# Patient Record
Sex: FEMALE | Race: WHITE | URBAN - METROPOLITAN AREA
[De-identification: names, ages, dates, MRNs, and addresses within clinical notes are randomized per-mention and may not be internally consistent; named-entity substitution may affect disease eponyms.]

---

## 2017-12-14 ENCOUNTER — HOSPITAL ENCOUNTER (EMERGENCY)
Facility: CLINIC | Age: 42
Discharge: OTHER INSTITUTION | End: 2017-12-14
Attending: EMERGENCY MEDICINE | Admitting: EMERGENCY MEDICINE
Payer: COMMERCIAL

## 2017-12-14 ENCOUNTER — APPOINTMENT (OUTPATIENT)
Dept: CT IMAGING | Facility: CLINIC | Age: 42
End: 2017-12-14
Attending: EMERGENCY MEDICINE
Payer: COMMERCIAL

## 2017-12-14 VITALS
DIASTOLIC BLOOD PRESSURE: 60 MMHG | WEIGHT: 157 LBS | HEART RATE: 71 BPM | TEMPERATURE: 97.9 F | OXYGEN SATURATION: 98 % | SYSTOLIC BLOOD PRESSURE: 97 MMHG | RESPIRATION RATE: 16 BRPM

## 2017-12-14 DIAGNOSIS — R14.2 FLATULENCE, ERUCTATION, AND GAS PAIN: ICD-10-CM

## 2017-12-14 DIAGNOSIS — R14.3 FLATULENCE, ERUCTATION, AND GAS PAIN: ICD-10-CM

## 2017-12-14 DIAGNOSIS — K59.00 CONSTIPATION, UNSPECIFIED CONSTIPATION TYPE: ICD-10-CM

## 2017-12-14 DIAGNOSIS — R14.1 FLATULENCE, ERUCTATION, AND GAS PAIN: ICD-10-CM

## 2017-12-14 LAB
ALBUMIN SERPL-MCNC: 3.4 G/DL (ref 3.4–5)
ALBUMIN UR-MCNC: NEGATIVE MG/DL
ALP SERPL-CCNC: 113 U/L (ref 40–150)
ALT SERPL W P-5'-P-CCNC: 29 U/L (ref 0–50)
AMPHETAMINES UR QL SCN: NEGATIVE
ANION GAP SERPL CALCULATED.3IONS-SCNC: 9 MMOL/L (ref 3–14)
APPEARANCE UR: CLEAR
AST SERPL W P-5'-P-CCNC: 28 U/L (ref 0–45)
BARBITURATES UR QL: NEGATIVE
BASOPHILS # BLD AUTO: 0 10E9/L (ref 0–0.2)
BASOPHILS NFR BLD AUTO: 0.4 %
BENZODIAZ UR QL: NEGATIVE
BILIRUB SERPL-MCNC: 0.1 MG/DL (ref 0.2–1.3)
BILIRUB UR QL STRIP: NEGATIVE
BUN SERPL-MCNC: 25 MG/DL (ref 7–30)
CALCIUM SERPL-MCNC: 8.4 MG/DL (ref 8.5–10.1)
CANNABINOIDS UR QL SCN: NEGATIVE
CHLORIDE SERPL-SCNC: 108 MMOL/L (ref 94–109)
CO2 SERPL-SCNC: 24 MMOL/L (ref 20–32)
COCAINE UR QL: NEGATIVE
COLOR UR AUTO: YELLOW
CREAT SERPL-MCNC: 0.58 MG/DL (ref 0.52–1.04)
DIFFERENTIAL METHOD BLD: ABNORMAL
EOSINOPHIL # BLD AUTO: 0.3 10E9/L (ref 0–0.7)
EOSINOPHIL NFR BLD AUTO: 3.3 %
ERYTHROCYTE [DISTWIDTH] IN BLOOD BY AUTOMATED COUNT: 14.8 % (ref 10–15)
GFR SERPL CREATININE-BSD FRML MDRD: >90 ML/MIN/1.7M2
GLUCOSE SERPL-MCNC: 112 MG/DL (ref 70–99)
GLUCOSE UR STRIP-MCNC: NEGATIVE MG/DL
HCT VFR BLD AUTO: 33.1 % (ref 35–47)
HGB BLD-MCNC: 10.6 G/DL (ref 11.7–15.7)
HGB UR QL STRIP: NEGATIVE
IMM GRANULOCYTES # BLD: 0 10E9/L (ref 0–0.4)
IMM GRANULOCYTES NFR BLD: 0.3 %
KETONES UR STRIP-MCNC: NEGATIVE MG/DL
LACTATE BLD-SCNC: 1 MMOL/L (ref 0.7–2)
LEUKOCYTE ESTERASE UR QL STRIP: NEGATIVE
LIPASE SERPL-CCNC: 132 U/L (ref 73–393)
LYMPHOCYTES # BLD AUTO: 2.8 10E9/L (ref 0.8–5.3)
LYMPHOCYTES NFR BLD AUTO: 35 %
MAGNESIUM SERPL-MCNC: 2.3 MG/DL (ref 1.6–2.3)
MCH RBC QN AUTO: 29.2 PG (ref 26.5–33)
MCHC RBC AUTO-ENTMCNC: 32 G/DL (ref 31.5–36.5)
MCV RBC AUTO: 91 FL (ref 78–100)
MONOCYTES # BLD AUTO: 0.8 10E9/L (ref 0–1.3)
MONOCYTES NFR BLD AUTO: 9.4 %
NEUTROPHILS # BLD AUTO: 4.1 10E9/L (ref 1.6–8.3)
NEUTROPHILS NFR BLD AUTO: 51.6 %
NITRATE UR QL: NEGATIVE
OPIATES UR QL SCN: NEGATIVE
PCP UR QL SCN: NEGATIVE
PH UR STRIP: 5.5 PH (ref 5–7)
PHOSPHATE SERPL-MCNC: 4.1 MG/DL (ref 2.5–4.5)
PLATELET # BLD AUTO: 378 10E9/L (ref 150–450)
POTASSIUM SERPL-SCNC: 4.1 MMOL/L (ref 3.4–5.3)
PROT SERPL-MCNC: 6.8 G/DL (ref 6.8–8.8)
RBC # BLD AUTO: 3.63 10E12/L (ref 3.8–5.2)
SODIUM SERPL-SCNC: 141 MMOL/L (ref 133–144)
SOURCE: NORMAL
SP GR UR STRIP: 1.02 (ref 1–1.03)
UROBILINOGEN UR STRIP-MCNC: NORMAL MG/DL (ref 0–2)
WBC # BLD AUTO: 8 10E9/L (ref 4–11)

## 2017-12-14 PROCEDURE — 85025 COMPLETE CBC W/AUTO DIFF WBC: CPT | Performed by: EMERGENCY MEDICINE

## 2017-12-14 PROCEDURE — 83690 ASSAY OF LIPASE: CPT | Performed by: EMERGENCY MEDICINE

## 2017-12-14 PROCEDURE — 80053 COMPREHEN METABOLIC PANEL: CPT | Performed by: EMERGENCY MEDICINE

## 2017-12-14 PROCEDURE — 84100 ASSAY OF PHOSPHORUS: CPT | Performed by: EMERGENCY MEDICINE

## 2017-12-14 PROCEDURE — 25000132 ZZH RX MED GY IP 250 OP 250 PS 637: Performed by: EMERGENCY MEDICINE

## 2017-12-14 PROCEDURE — 25000125 ZZHC RX 250: Performed by: EMERGENCY MEDICINE

## 2017-12-14 PROCEDURE — 80307 DRUG TEST PRSMV CHEM ANLYZR: CPT | Performed by: EMERGENCY MEDICINE

## 2017-12-14 PROCEDURE — 74177 CT ABD & PELVIS W/CONTRAST: CPT

## 2017-12-14 PROCEDURE — 83735 ASSAY OF MAGNESIUM: CPT | Performed by: EMERGENCY MEDICINE

## 2017-12-14 PROCEDURE — 99284 EMERGENCY DEPT VISIT MOD MDM: CPT | Mod: Z6 | Performed by: EMERGENCY MEDICINE

## 2017-12-14 PROCEDURE — 81003 URINALYSIS AUTO W/O SCOPE: CPT | Performed by: EMERGENCY MEDICINE

## 2017-12-14 PROCEDURE — 99285 EMERGENCY DEPT VISIT HI MDM: CPT | Mod: 25 | Performed by: EMERGENCY MEDICINE

## 2017-12-14 PROCEDURE — 25000128 H RX IP 250 OP 636: Performed by: EMERGENCY MEDICINE

## 2017-12-14 PROCEDURE — 83605 ASSAY OF LACTIC ACID: CPT | Performed by: EMERGENCY MEDICINE

## 2017-12-14 RX ORDER — IOPAMIDOL 755 MG/ML
80 INJECTION, SOLUTION INTRAVASCULAR ONCE
Status: COMPLETED | OUTPATIENT
Start: 2017-12-14 | End: 2017-12-14

## 2017-12-14 RX ORDER — MAGNESIUM CARB/ALUMINUM HYDROX 105-160MG
296 TABLET,CHEWABLE ORAL ONCE
Status: COMPLETED | OUTPATIENT
Start: 2017-12-14 | End: 2017-12-14

## 2017-12-14 RX ORDER — MULTIVITAMIN,THERAPEUTIC
1 TABLET ORAL DAILY
COMMUNITY

## 2017-12-14 RX ADMIN — MAGNESIUM CITRATE 296 ML: 1.75 LIQUID ORAL at 20:52

## 2017-12-14 RX ADMIN — IOPAMIDOL 80 ML: 755 INJECTION, SOLUTION INTRAVENOUS at 19:09

## 2017-12-14 RX ADMIN — SODIUM CHLORIDE 60 ML: 9 INJECTION, SOLUTION INTRAVENOUS at 19:09

## 2017-12-14 ASSESSMENT — ENCOUNTER SYMPTOMS
ABDOMINAL DISTENTION: 1
NAUSEA: 0
VOMITING: 0
CONSTIPATION: 0
APPETITE CHANGE: 0
ABDOMINAL PAIN: 1

## 2017-12-14 NOTE — ED AVS SNAPSHOT
[facility]    [address] [address]    Phone:  [phone]    Fax:  [phone]                                       Laura Lea   MRN: [id number]    Department:  [facility]   Date of Visit:  12/14/2017           After Visit Summary Signature Page     I have received my discharge instructions, and my questions have been answered. I have discussed any challenges I see with this plan with the nurse or doctor.    ..........................................................................................................................................  Patient/Patient Representative Signature      ..........................................................................................................................................  Patient Representative Print Name and Relationship to Patient    ..................................................               ................................................  Date                                            Time    ..........................................................................................................................................  Reviewed by Signature/Title    ...................................................              ..............................................  Date                                                            Time

## 2017-12-14 NOTE — ED PROVIDER NOTES
History     Chief Complaint   Patient presents with     Abdominal Pain     Pt having abdominal pain and distention since early December.  Has had gastric bypass, wondering if she has a blockage.      HPI  Laura Lea is a 42 year old female who presents to the emergency department for evaluation of abdominal pain. The patient reports that she has is a recovering methamphetamine addict and has been in rehab for 4 weeks. Since starting rehab she has regained her appetite and began to eat more. Since the beginning of December she has been noticing abdominal distension with some pain and she is worried about a blockage. She has been having bowel movements and denies vomiting. She has been taking gas-x and milk of magnesia. She notes that she has been eating a lot of chewy candies. She has a history of gastric bypass in 2009. She still has her gallbladder and has no family history of bowel problems.  She has had no fever chills.  No exposure to infectious illness.  Previous history of alcohol abuse but has been sober for a number of years.  She denies any upper respiratory symptoms such as congestion, sore throat, chest pain, shortness of breath or cough.  No urinary symptoms.  No diarrhea/dark or bloody stools        Problem List:    There are no active problems to display for this patient.       Past Medical History:    No past medical history on file.    Past Surgical History:    No past surgical history on file.    Family History:    No family history on file.    Social History:  Marital Status:    Social History   Substance Use Topics     Smoking status: Not on file     Smokeless tobacco: Not on file     Alcohol use Not on file        Medications:      Acetaminophen (TYLENOL PO)   BACLOFEN PO   DULOXETINE HCL PO   IBUPROFEN PO   MELATONIN PO   GABAPENTIN PO   RisperiDONE (RISPERDAL PO)   SIMETHICONE-80 PO   Cyanocobalamin (B-12) 2500 MCG SUBL   DOCUSATE SODIUM PO   multivitamin, therapeutic (THERA-VIT) TABS  tablet   magnesium hydroxide (MILK OF MAGNESIA) 400 MG/5ML suspension         Review of Systems   Constitutional: Negative for appetite change.   Gastrointestinal: Positive for abdominal distention and abdominal pain. Negative for constipation, nausea and vomiting.       Physical Exam   BP: 105/67  Heart Rate: 71  Temp: 97.9  F (36.6  C)  Resp: 16  Weight: 71.2 kg (157 lb)  SpO2: 100 %      Physical Exam general alert cooperative female in mild to moderate distress.  HEENT shows no scleral icterus.  Neck is supple.  Lungs are clear.  Cardiac regular without murmur.  Back no CVA tenderness.  Abdomen reveals distention but active bowel sounds.  On palpation no localizing tenderness, masses, or organomegaly.  No shifting dullness that would suggest ascites.  Patient does have some scabbed lesions on her trunk and extremities.  No secondary infection.  No skin rashes which would suggest shingles.  No leg pain or swelling    ED Course     ED Course     Procedures           Results for orders placed or performed during the hospital encounter of 12/14/17   CT Abdomen Pelvis w Contrast    Narrative    CT ABDOMEN AND PELVIS WITH CONTRAST 12/14/2017 7:11 PM     HISTORY: Bowel distention/previous history of gastric bypass.    TECHNIQUE: 80 mL Isovue-370. Radiation dose for this scan was reduced  using automated exposure control, adjustment of the mA and/or kV  according to patient size, or iterative reconstruction technique.    COMPARISON: None.    FINDINGS: Included portions of the lung bases are unremarkable. The  liver, spleen, and pancreas appear normal. No adrenal lesions. Kidneys  are normal. No retroperitoneal adenopathy or evidence of aortic  aneurysm.    There is a large amount of stool in the colon consistent with  constipation. No evidence of diverticulitis. There is very little  mesenteric fat in this patient making evaluation of the appendix  difficult. The appendix is not identified. No obvious  inflammatory  changes in the right lower quadrant.    There appears to be a surgical clip in the pelvis. There are changes  from previous gastric bypass surgery.    There is diffuse edema in the subcutaneous fat.      Impression    IMPRESSION:  1. Previous gastric bypass. There is very little mesenteric fat in the  abdomen making evaluation of the appendix and bowel wall difficult.  There does appear to be constipation with a large amount of stool in  the colon.  2. Diffuse subcutaneous edema. This may be related to hypoalbuminemia  in a malnourished patient. Recommend clinical correlation.  3. Appendix not identified, but no obvious inflammatory changes in the  right lower quadrant.            Critical Care time:  none               Labs Ordered and Resulted from Time of ED Arrival Up to the Time of Departure from the ED   CBC WITH PLATELETS DIFFERENTIAL - Abnormal; Notable for the following:        Result Value    RBC Count 3.63 (*)     Hemoglobin 10.6 (*)     Hematocrit 33.1 (*)     All other components within normal limits   COMPREHENSIVE METABOLIC PANEL - Abnormal; Notable for the following:     Glucose 112 (*)     Calcium 8.4 (*)     Bilirubin Total 0.1 (*)     All other components within normal limits   LIPASE   LACTIC ACID WHOLE BLOOD   URINE MACROSCOPIC WITH REFLEX TO MICRO   DRUG ABUSE SCREEN 77 URINE ([place], [place], SH)   PHOSPHORUS   MAGNESIUM   PERIPHERAL IV CATHETER   FREE WATER       Results for orders placed or performed during the hospital encounter of 12/14/17 (from the past 24 hour(s))   UA reflex to Microscopic   Result Value Ref Range    Color Urine Yellow     Appearance Urine Clear     Glucose Urine Negative NEG^Negative mg/dL    Bilirubin Urine Negative NEG^Negative    Ketones Urine Negative NEG^Negative mg/dL    Specific Gravity Urine 1.016 1.003 - 1.035    Blood Urine Negative NEG^Negative    pH Urine 5.5 5.0 - 7.0 pH    Protein Albumin Urine Negative NEG^Negative mg/dL    Urobilinogen mg/dL Normal  0.0 - 2.0 mg/dL    Nitrite Urine Negative NEG^Negative    Leukocyte Esterase Urine Negative NEG^Negative    Source Midstream Urine    Drug abuse screen 77 urine ([place])   Result Value Ref Range    Amphetamine Qual Urine Negative NEG^Negative    Barbiturates Qual Urine Negative NEG^Negative    Benzodiazepine Qual Urine Negative NEG^Negative    Cannabinoids Qual Urine Negative NEG^Negative    Cocaine Qual Urine Negative NEG^Negative    Opiates Qualitative Urine Negative NEG^Negative    PCP Qual Urine Negative NEG^Negative   CBC with platelets differential   Result Value Ref Range    WBC 8.0 4.0 - 11.0 10e9/L    RBC Count 3.63 (L) 3.8 - 5.2 10e12/L    Hemoglobin 10.6 (L) 11.7 - 15.7 g/dL    Hematocrit 33.1 (L) 35.0 - 47.0 %    MCV 91 78 - 100 fl    MCH 29.2 26.5 - 33.0 pg    MCHC 32.0 31.5 - 36.5 g/dL    RDW 14.8 10.0 - 15.0 %    Platelet Count 378 150 - 450 10e9/L    Diff Method Automated Method     % Neutrophils 51.6 %    % Lymphocytes 35.0 %    % Monocytes 9.4 %    % Eosinophils 3.3 %    % Basophils 0.4 %    % Immature Granulocytes 0.3 %    Absolute Neutrophil 4.1 1.6 - 8.3 10e9/L    Absolute Lymphocytes 2.8 0.8 - 5.3 10e9/L    Absolute Monocytes 0.8 0.0 - 1.3 10e9/L    Absolute Eosinophils 0.3 0.0 - 0.7 10e9/L    Absolute Basophils 0.0 0.0 - 0.2 10e9/L    Abs Immature Granulocytes 0.0 0 - 0.4 10e9/L   Comprehensive metabolic panel   Result Value Ref Range    Sodium 141 133 - 144 mmol/L    Potassium 4.1 3.4 - 5.3 mmol/L    Chloride 108 94 - 109 mmol/L    Carbon Dioxide 24 20 - 32 mmol/L    Anion Gap 9 3 - 14 mmol/L    Glucose 112 (H) 70 - 99 mg/dL    Urea Nitrogen 25 7 - 30 mg/dL    Creatinine 0.58 0.52 - 1.04 mg/dL    GFR Estimate >90 >60 mL/min/1.7m2    GFR Estimate If Black >90 >60 mL/min/1.7m2    Calcium 8.4 (L) 8.5 - 10.1 mg/dL    Bilirubin Total 0.1 (L) 0.2 - 1.3 mg/dL    Albumin 3.4 3.4 - 5.0 g/dL    Protein Total 6.8 6.8 - 8.8 g/dL    Alkaline Phosphatase 113 40 - 150 U/L    ALT 29 0 - 50 U/L    AST 28 0  - 45 U/L   Lipase   Result Value Ref Range    Lipase 132 73 - 393 U/L   Lactic acid whole blood   Result Value Ref Range    Lactic Acid 1.0 0.7 - 2.0 mmol/L   Phosphorus   Result Value Ref Range    Phosphorus 4.1 2.5 - 4.5 mg/dL   Magnesium   Result Value Ref Range    Magnesium 2.3 1.6 - 2.3 mg/dL   CT Abdomen Pelvis w Contrast    Narrative    CT ABDOMEN AND PELVIS WITH CONTRAST 12/14/2017 7:11 PM     HISTORY: Bowel distention/previous history of gastric bypass.    TECHNIQUE: 80 mL Isovue-370. Radiation dose for this scan was reduced  using automated exposure control, adjustment of the mA and/or kV  according to patient size, or iterative reconstruction technique.    COMPARISON: None.    FINDINGS: Included portions of the lung bases are unremarkable. The  liver, spleen, and pancreas appear normal. No adrenal lesions. Kidneys  are normal. No retroperitoneal adenopathy or evidence of aortic  aneurysm.    There is a large amount of stool in the colon consistent with  constipation. No evidence of diverticulitis. There is very little  mesenteric fat in this patient making evaluation of the appendix  difficult. The appendix is not identified. No obvious inflammatory  changes in the right lower quadrant.    There appears to be a surgical clip in the pelvis. There are changes  from previous gastric bypass surgery.    There is diffuse edema in the subcutaneous fat.      Impression    IMPRESSION:  1. Previous gastric bypass. There is very little mesenteric fat in the  abdomen making evaluation of the appendix and bowel wall difficult.  There does appear to be constipation with a large amount of stool in  the colon.  2. Diffuse subcutaneous edema. This may be related to hypoalbuminemia  in a malnourished patient. Recommend clinical correlation.  3. Appendix not identified, but no obvious inflammatory changes in the  right lower quadrant.       Medications   magnesium citrate solution 296 mL (not administered)   iopamidol  (ISOVUE-370) solution 80 mL (80 mLs Intravenous Given 12/14/17 1909)   sodium chloride 0.9 % bag 500mL for CT scan flush use (60 mLs As instructed Given 12/14/17 1909)       4:28 PM Patient Assessed  Blood work was obtained.  Urinalysis is ordered.  Imaging studies pending results of blood work.  At 6:40 PM discussed results the patient's blood work showing no pancreatitis, hepatitis or biliary disease.  In addition her lactic acid, phosphorus and magnesium were normal.  A CT of the abdomen with oral water and IV contrast is ordered further assess.  At 8:05 PM patient is soundly sleeping.  Easily awakened.  In no obvious distress.  Discussed results for CT showing some edema in the subcu tissue.  Her albumin is normal. She does have moderate stool throughout the entire colon consistent with constipation.  Assessments & Plan (with Medical Decision Making)   Patient is a 42-year-old female presents from [facility] where she is currently undergoing treatment for methamphetamine abuse.  She has been at the facility for approximately 4 weeks.  Since starting rehab she states she regained her appetite and began to eat more.  Since beginning of December she has noticed that she is more distended with some intermittent right-sided pain in the upper abdomen.  She thinks she may have an obstructive problem or blockage.  However she has not been vomiting and has been stooling on a regular basis.  No urinary symptoms.  She attempted Gas-X without relief of her discomfort and milk of mag without significant stooling.  He has not had fever or chills.  She had a previous history of alcohol abuse but has been sober for a number of years.  She currently denies any upper respiratory symptoms such as congestion, sore throat, chest pain, shortness of breath or cough.  She has had no dark or bloody stools.  No diarrhea.  Had previous gastric bypass but otherwise no abdominal surgeries.  Presentation she was afebrile and vitally stable.   She is not hypoxic.  She had normal cardiac and respiratory auscultation.  She had no scleral icterus.  No CVA tenderness.  She did have a distended abdomen but had no shifting dullness which would suggest ascites.  She had mild right upper quadrant tenderness without guarding or rebound.  No organomegaly or masses.  She does have some small scabbed lesions on the trunk and extremities but no signs of secondary infection.  Blood work was obtained and was unremarkable including CBC, comprehensive metabolic panel, phosphorus and magnesium.  Patient's urine was unremarkable.  Abdominal CT with oral water and IV contrast is ordered to further assess and this showed no evidence of obstruction.  Moderate stool consistent with constipation.  There was some edema in the subcu tissue which could be seen with malnutrition.  Patient's albumin vero was normal.  Doubt this of clinical significance.  She will be given me some citrate to hopefully evacuate stool and make her distention and pain improved.  Handout on constipation is provided reasons to return for reassessment discussed.  I have reviewed the nursing notes.    I have reviewed the findings, diagnosis, plan and need for follow up with the patient.       New Prescriptions    No medications on file       Final diagnoses:   Flatulence, eructation, and gas pain   Constipation, unspecified constipation type     This document serves as a record of the services and decisions personally performed and made by Facundo Thakkar MD. It was created on HIS/HER behalf by Clint Vargas, a trained medical scribe. The creation of this document is based the provider's statements to the medical scribe.  Clint Vargas 4:28 PM 12/14/2017    Provider:   The information in this document, created by the medical scribe for me, accurately reflects the services I personally performed and the decisions made by me. I have reviewed and approved this document for accuracy prior to leaving the patient  care area.  Facundo Thakkar MD 4:28 PM 12/14/2017 12/14/2017   [facility] EMERGENCY DEPARTMENT     Facundo Thakkar MD  12/14/17 2013

## 2017-12-15 NOTE — DISCHARGE INSTRUCTIONS
